# Patient Record
Sex: MALE | Race: WHITE | Employment: FULL TIME | URBAN - METROPOLITAN AREA
[De-identification: names, ages, dates, MRNs, and addresses within clinical notes are randomized per-mention and may not be internally consistent; named-entity substitution may affect disease eponyms.]

---

## 2017-02-16 DIAGNOSIS — N40.0 PROSTATISM: ICD-10-CM

## 2017-02-16 RX ORDER — TADALAFIL 5 MG/1
5 TABLET ORAL DAILY
Qty: 30 TABLET | Refills: 3 | Status: SHIPPED | OUTPATIENT
Start: 2017-02-16 | End: 2017-04-12 | Stop reason: SDUPTHER

## 2017-04-12 DIAGNOSIS — N40.0 PROSTATISM: ICD-10-CM

## 2017-05-08 RX ORDER — TADALAFIL 5 MG/1
5 TABLET ORAL DAILY
Qty: 30 TABLET | Refills: 3 | Status: SHIPPED | OUTPATIENT
Start: 2017-05-08 | End: 2018-01-29 | Stop reason: SDUPTHER

## 2017-08-14 DIAGNOSIS — I10 ESSENTIAL HYPERTENSION: ICD-10-CM

## 2017-08-15 RX ORDER — LOSARTAN POTASSIUM AND HYDROCHLOROTHIAZIDE 25; 100 MG/1; MG/1
TABLET ORAL
Qty: 90 TABLET | Refills: 0 | Status: SHIPPED | OUTPATIENT
Start: 2017-08-15 | End: 2017-09-08 | Stop reason: DRUGHIGH

## 2017-09-08 ENCOUNTER — OFFICE VISIT (OUTPATIENT)
Dept: FAMILY MEDICINE CLINIC | Age: 57
End: 2017-09-08

## 2017-09-08 VITALS
BODY MASS INDEX: 29.75 KG/M2 | OXYGEN SATURATION: 98 % | DIASTOLIC BLOOD PRESSURE: 80 MMHG | WEIGHT: 238 LBS | TEMPERATURE: 98.1 F | HEART RATE: 73 BPM | SYSTOLIC BLOOD PRESSURE: 120 MMHG

## 2017-09-08 DIAGNOSIS — I10 ESSENTIAL HYPERTENSION: ICD-10-CM

## 2017-09-08 PROCEDURE — 99213 OFFICE O/P EST LOW 20 MIN: CPT | Performed by: NURSE PRACTITIONER

## 2017-09-08 RX ORDER — LOSARTAN POTASSIUM AND HYDROCHLOROTHIAZIDE 25; 100 MG/1; MG/1
TABLET ORAL
Qty: 90 TABLET | Refills: 5 | Status: SHIPPED | OUTPATIENT
Start: 2017-09-08

## 2017-09-08 ASSESSMENT — ENCOUNTER SYMPTOMS: SHORTNESS OF BREATH: 0

## 2017-10-31 ENCOUNTER — OFFICE VISIT (OUTPATIENT)
Dept: ORTHOPEDIC SURGERY | Age: 57
End: 2017-10-31

## 2017-10-31 VITALS
BODY MASS INDEX: 31.08 KG/M2 | WEIGHT: 250 LBS | HEIGHT: 75 IN | DIASTOLIC BLOOD PRESSURE: 90 MMHG | HEART RATE: 79 BPM | SYSTOLIC BLOOD PRESSURE: 139 MMHG

## 2017-10-31 DIAGNOSIS — S93.601A FOOT SPRAIN, RIGHT, INITIAL ENCOUNTER: ICD-10-CM

## 2017-10-31 DIAGNOSIS — M79.671 FOOT PAIN, RIGHT: Primary | ICD-10-CM

## 2017-10-31 PROCEDURE — G8427 DOCREV CUR MEDS BY ELIG CLIN: HCPCS | Performed by: PHYSICIAN ASSISTANT

## 2017-10-31 PROCEDURE — 99203 OFFICE O/P NEW LOW 30 MIN: CPT | Performed by: PHYSICIAN ASSISTANT

## 2017-10-31 PROCEDURE — 3017F COLORECTAL CA SCREEN DOC REV: CPT | Performed by: PHYSICIAN ASSISTANT

## 2017-10-31 PROCEDURE — G8484 FLU IMMUNIZE NO ADMIN: HCPCS | Performed by: PHYSICIAN ASSISTANT

## 2017-10-31 PROCEDURE — G8417 CALC BMI ABV UP PARAM F/U: HCPCS | Performed by: PHYSICIAN ASSISTANT

## 2017-10-31 PROCEDURE — 73630 X-RAY EXAM OF FOOT: CPT | Performed by: PHYSICIAN ASSISTANT

## 2017-10-31 PROCEDURE — 1036F TOBACCO NON-USER: CPT | Performed by: PHYSICIAN ASSISTANT

## 2017-10-31 PROCEDURE — L3260 AMBULATORY SURGICAL BOOT EAC: HCPCS | Performed by: PHYSICIAN ASSISTANT

## 2017-10-31 NOTE — PROGRESS NOTES
Patient: Beti Aiken    MRN: [id number]  YOB: 1960          Age: 59 y.o. Sex: male    Subjective     Chief Complaint:  Pain (R Foot)      History of Present Illness:  Beti Aiken is a 59 y.o. male who presents tonight for evaluation of right great toe and 1st metatarsal pain. Patient states that yesterday he was ambulating down steps when he caught his heel pitching his foot forward creating a hyper flexion injury to his right great toe. He had immediate pain with late swelling. Since the injury he's had increased pain with weightbearing to the point where he is walking on the lateral border of his right foot. He states the pain is mainly at the 1st MTP joint. He has been icing the foot and did take ibuprofen with some benefit. He's had no previous injuries to the foot and he is ambulating with a limp. Pain Assessment  Location of Pain: Foot  Location Modifiers: Right  Severity of Pain: 7  Quality of Pain: Sharp, Throbbing  Duration of Pain: A few minutes  Frequency of Pain: Several times daily  Aggravating Factors: Walking, Standing  Limiting Behavior: Some  Relieving Factors: Rest  Result of Injury: Yes  Work-Related Injury: No  Are there other pain locations you wish to document?: No      Medical History  Current Medications:   Current Outpatient Prescriptions   Medication Sig Dispense Refill    losartan-hydrochlorothiazide (HYZAAR) 100-25 MG per tablet TAKE 1 TABLET BY MOUTH EVERY DAY 90 tablet 5    tadalafil (CIALIS) 5 MG tablet Take 1 tablet by mouth daily 30 tablet 3     No current facility-administered medications for this visit. Medical History:   Past Medical History:   Diagnosis Date    Basal cell carcinoma of skin     ear, nose.   Dr. Kylah Yoo Family history of colon cancer     Hemochromatosis     phlebotomy q 3months Dr[facility]on [facility]    Hyperlipidemia     Hypertension     borderline    Renal calculi 2000, 2002 initial encounter        Office Procedures:  Orders Placed This Encounter   Procedures    XR FOOT RIGHT (MIN 3 VIEWS)    Darco Post-op Shoe Brace     Patient was prescribed a Darco Post-Op Shoe. The right foot will require stabilization / immobilization from this semi-rigid / rigid orthosis to improve their function. The orthosis will assist in protecting the affected area, provide functional support and facilitate healing. The patient was educated and fit by a healthcare professional with expert knowledge and specialization in brace application while under the direct supervision of the treating physician. Verbal and written instructions for the use of and application of this item were provided. They were instructed to contact the office immediately should the brace result in increased pain, decreased sensation, increased swelling or worsening of the condition. Treatment Plan:  Patient was placed into a postop shoe that he is to wear with all his weightbearing activity until he can comfortably get into normal shoe wear. He is to continue with icing and may take ibuprofen 800 mg twice a day with food. He will follow-up with Dr. Saloni Lam in 2 weeks as needed. Nohelia Aguila PA-C    * Please note that some or all of this record was generated using voice recognition software. If there are any questions about the content of this document, please contact me as some errors in transcription may have occurred.

## 2017-12-19 ENCOUNTER — OFFICE VISIT (OUTPATIENT)
Dept: ORTHOPEDIC SURGERY | Age: 57
End: 2017-12-19

## 2017-12-19 VITALS
DIASTOLIC BLOOD PRESSURE: 71 MMHG | WEIGHT: 250 LBS | HEART RATE: 64 BPM | HEIGHT: 75 IN | BODY MASS INDEX: 31.08 KG/M2 | SYSTOLIC BLOOD PRESSURE: 120 MMHG

## 2017-12-19 DIAGNOSIS — M94.9 OSTEOCHONDRAL LESION: Primary | ICD-10-CM

## 2017-12-19 DIAGNOSIS — M89.9 OSTEOCHONDRAL LESION: Primary | ICD-10-CM

## 2017-12-19 DIAGNOSIS — S90.31XA CONTUSION OF RIGHT FOOT, INITIAL ENCOUNTER: ICD-10-CM

## 2017-12-19 PROCEDURE — 3017F COLORECTAL CA SCREEN DOC REV: CPT | Performed by: PODIATRIST

## 2017-12-19 PROCEDURE — 1036F TOBACCO NON-USER: CPT | Performed by: PODIATRIST

## 2017-12-19 PROCEDURE — G8484 FLU IMMUNIZE NO ADMIN: HCPCS | Performed by: PODIATRIST

## 2017-12-19 PROCEDURE — 99203 OFFICE O/P NEW LOW 30 MIN: CPT | Performed by: PODIATRIST

## 2017-12-19 PROCEDURE — G8417 CALC BMI ABV UP PARAM F/U: HCPCS | Performed by: PODIATRIST

## 2017-12-19 PROCEDURE — G8427 DOCREV CUR MEDS BY ELIG CLIN: HCPCS | Performed by: PODIATRIST

## 2017-12-19 RX ORDER — PREDNISONE 10 MG/1
TABLET ORAL
Qty: 26 TABLET | Refills: 0 | Status: SHIPPED | OUTPATIENT
Start: 2017-12-19 | End: 2017-12-22 | Stop reason: ALTCHOICE

## 2017-12-19 NOTE — PROGRESS NOTES
HISTORY OF PRESENT ILLNESS: This is an initial visit for a 41-year-old male presents with pain to his right great toe. At the end of October, he tripped going down stairs and stubbed his toe badly. Since then, he's had pain and swelling with multiple episodes of sharp pain easily aggravated with activity. He has been taking over-the-counter anti-inflammatories but that has not been helping. He's also been using postop shoe. FAMILY HISTORY: Documented in chart. SOCIAL HISTORY: Documented in chart. REVIEW OF SYSTEMS:  The patient denies any issues with dermatologic, pulmonary, cardiovascular, genitourinary, hematologic, gastrointestinal, neurologic, psychiatric, and HEENT systems. PHYSICAL EXAMINATION:     The patient is alert and orientated x3. He has mild to moderate edema to the right 1st MTP and base of the hallux with mild ecchymosis. There is no ascending cellulitis. No open lesions are present. He has pain with range of motion to the toe and very limited plantarflexion available. He is able to actively dorsiflex the toe. He has palpable pedal pulses. Sensation is grossly intact. The remainder of the exam is unremarkable. RADIOGRAPHS: 3 nonweightbearing views of the right foot were taken. These demonstrate an approximate 1 cm osteochondral lesion in the dorsal central aspect of the 1st metatarsal head. No acute fractures are noted. ASSESSMENT: 1st MTP osteochondral lesion, contusion right foot      PLAN: The patient was educated on the pathology and its treatment options. I ordered an MRI to evaluate the articular surface of the 1st MTP. We discussed potential results of the MRI and her treatment options. In the meantime, I prescribed him a prednisone 10 mg taper and advised him to use the postop shoe as needed. We will discuss the results the MRI on the phone to coordinate follow-up care.

## 2017-12-22 ENCOUNTER — OFFICE VISIT (OUTPATIENT)
Dept: FAMILY MEDICINE CLINIC | Age: 57
End: 2017-12-22

## 2017-12-22 VITALS
SYSTOLIC BLOOD PRESSURE: 120 MMHG | OXYGEN SATURATION: 96 % | HEART RATE: 68 BPM | DIASTOLIC BLOOD PRESSURE: 80 MMHG | WEIGHT: 264 LBS | BODY MASS INDEX: 33 KG/M2

## 2017-12-22 DIAGNOSIS — Z01.818 PRE-OP EXAM: Primary | ICD-10-CM

## 2017-12-22 LAB
BASOPHILS ABSOLUTE: 0.1 K/UL (ref 0–0.2)
BASOPHILS RELATIVE PERCENT: 1.3 %
EOSINOPHILS ABSOLUTE: 0.2 K/UL (ref 0–0.6)
EOSINOPHILS RELATIVE PERCENT: 3.1 %
HCT VFR BLD CALC: 46.1 % (ref 40.5–52.5)
HEMOGLOBIN: 15.9 G/DL (ref 13.5–17.5)
LYMPHOCYTES ABSOLUTE: 2.6 K/UL (ref 1–5.1)
LYMPHOCYTES RELATIVE PERCENT: 37.9 %
MCH RBC QN AUTO: 31.1 PG (ref 26–34)
MCHC RBC AUTO-ENTMCNC: 34.6 G/DL (ref 31–36)
MCV RBC AUTO: 89.9 FL (ref 80–100)
MONOCYTES ABSOLUTE: 0.6 K/UL (ref 0–1.3)
MONOCYTES RELATIVE PERCENT: 8.1 %
NEUTROPHILS ABSOLUTE: 3.5 K/UL (ref 1.7–7.7)
NEUTROPHILS RELATIVE PERCENT: 49.6 %
PDW BLD-RTO: 13.5 % (ref 12.4–15.4)
PLATELET # BLD: 223 K/UL (ref 135–450)
PMV BLD AUTO: 8.5 FL (ref 5–10.5)
RBC # BLD: 5.13 M/UL (ref 4.2–5.9)
WBC # BLD: 7 K/UL (ref 4–11)

## 2017-12-22 PROCEDURE — 99243 OFF/OP CNSLTJ NEW/EST LOW 30: CPT | Performed by: FAMILY MEDICINE

## 2017-12-22 PROCEDURE — G8427 DOCREV CUR MEDS BY ELIG CLIN: HCPCS | Performed by: FAMILY MEDICINE

## 2017-12-22 PROCEDURE — G8417 CALC BMI ABV UP PARAM F/U: HCPCS | Performed by: FAMILY MEDICINE

## 2017-12-22 PROCEDURE — G8484 FLU IMMUNIZE NO ADMIN: HCPCS | Performed by: FAMILY MEDICINE

## 2017-12-22 PROCEDURE — 93000 ELECTROCARDIOGRAM COMPLETE: CPT | Performed by: FAMILY MEDICINE

## 2017-12-22 PROCEDURE — 3017F COLORECTAL CA SCREEN DOC REV: CPT | Performed by: FAMILY MEDICINE

## 2017-12-22 NOTE — PROGRESS NOTES
[facility]  [phone]  Fax: [phone]   Pre-operative History and Physical      DIAGNOSIS:  Right great toe joint injury    PROCEDURE:  Right great toe arthroscopic surgery      History Obtained From:  patient    HISTORY OF PRESENT ILLNESS:    The patient is a 59 y.o. male with significant past medical history of hurting toe toward the end of October who presents for preoperative exam.       Past Medical History:   Diagnosis Date    Basal cell carcinoma of skin     ear, nose. Dr. Malathi Pro history of colon cancer     Hemochromatosis     phlebotomy q 3months Dr. Krzysztof Watkins at [facility]    Hyperlipidemia     Hypertension     borderline    Renal calculi 2000, 2002    x2     Past Surgical History:   Procedure Laterality Date    COLONOSCOPY  2002        COLONOSCOPY  2016    SKIN BIOPSY      TONSILLECTOMY       Current Outpatient Prescriptions   Medication Sig Dispense Refill    losartan-hydrochlorothiazide (HYZAAR) 100-25 MG per tablet TAKE 1 TABLET BY MOUTH EVERY DAY 90 tablet 5    tadalafil (CIALIS) 5 MG tablet Take 1 tablet by mouth daily 30 tablet 3     No current facility-administered medications for this visit. Allergies:  Tetanus toxoids  History of allergic reaction to anesthesia:  No     History   Smoking Status    Never Smoker   Smokeless Tobacco    Never Used     The patient states he drinks 2 per week.     Family History   Problem Relation Age of Onset    High Blood Pressure Mother     Cancer Father 68     colon    Heart Disease Father 61     CAD    Hemochromatosis Father    [facility] Cancer Sister      breast    Hemochromatosis Sister        REVIEW OF SYSTEMS:    CONSTITUTIONAL:  negative  EYES:  negative  HEENT:  negative  RESPIRATORY:  negative  CARDIOVASCULAR:  negative  GASTROINTESTINAL:  negative  GENITOURINARY:  negative  INTEGUMENT/BREAST:  negative  HEMATOLOGIC/LYMPHATIC:  negative  ALLERGIC/IMMUNOLOGIC:

## 2017-12-27 ENCOUNTER — TELEPHONE (OUTPATIENT)
Dept: ORTHOPEDIC SURGERY | Age: 57
End: 2017-12-27

## 2017-12-27 ENCOUNTER — TELEPHONE (OUTPATIENT)
Dept: FAMILY MEDICINE CLINIC | Age: 57
End: 2017-12-27

## 2017-12-27 LAB
REASON FOR REJECTION: NORMAL
REJECTED TEST: NORMAL

## 2017-12-27 NOTE — TELEPHONE ENCOUNTER
[id number]/denied due to clinicals not meeting critera for surgery per [facility]

## 2017-12-27 NOTE — TELEPHONE ENCOUNTER
[facility] for pt to call back. Please let him know there was a lab error on one of the test. Let pt know that Dr. Lalito Anton said if they need it redrawn they can do it at the time of his procedure.

## 2017-12-29 ENCOUNTER — HOSPITAL ENCOUNTER (OUTPATIENT)
Dept: SURGERY | Age: 57
Discharge: HOME | End: 2017-12-29
Attending: PODIATRIST | Admitting: PODIATRIST

## 2017-12-29 VITALS
HEART RATE: 63 BPM | DIASTOLIC BLOOD PRESSURE: 74 MMHG | OXYGEN SATURATION: 94 % | TEMPERATURE: 97.3 F | SYSTOLIC BLOOD PRESSURE: 142 MMHG | RESPIRATION RATE: 14 BRPM

## 2017-12-29 DIAGNOSIS — M20.21 HALLUX RIGIDUS OF RIGHT FOOT: Primary | ICD-10-CM

## 2017-12-29 RX ORDER — MEPERIDINE HYDROCHLORIDE 50 MG/ML
12.5 INJECTION INTRAMUSCULAR; INTRAVENOUS; SUBCUTANEOUS EVERY 5 MIN PRN
Status: DISCONTINUED | OUTPATIENT
Start: 2017-12-29 | End: 2017-12-30 | Stop reason: HOSPADM

## 2017-12-29 RX ORDER — SODIUM CHLORIDE, SODIUM LACTATE, POTASSIUM CHLORIDE, CALCIUM CHLORIDE 600; 310; 30; 20 MG/100ML; MG/100ML; MG/100ML; MG/100ML
INJECTION, SOLUTION INTRAVENOUS CONTINUOUS
Status: DISCONTINUED | OUTPATIENT
Start: 2017-12-29 | End: 2017-12-30 | Stop reason: HOSPADM

## 2017-12-29 RX ORDER — ONDANSETRON 2 MG/ML
4 INJECTION INTRAMUSCULAR; INTRAVENOUS
Status: ACTIVE | OUTPATIENT
Start: 2017-12-29 | End: 2017-12-29

## 2017-12-29 RX ORDER — PROMETHAZINE HYDROCHLORIDE 25 MG/ML
6.25 INJECTION, SOLUTION INTRAMUSCULAR; INTRAVENOUS
Status: ACTIVE | OUTPATIENT
Start: 2017-12-29 | End: 2017-12-29

## 2017-12-29 RX ORDER — LIDOCAINE HYDROCHLORIDE 10 MG/ML
1 INJECTION, SOLUTION EPIDURAL; INFILTRATION; INTRACAUDAL; PERINEURAL
Status: ACTIVE | OUTPATIENT
Start: 2017-12-29 | End: 2017-12-29

## 2017-12-29 RX ORDER — OXYCODONE HYDROCHLORIDE AND ACETAMINOPHEN 5; 325 MG/1; MG/1
1 TABLET ORAL PRN
Status: ACTIVE | OUTPATIENT
Start: 2017-12-29 | End: 2017-12-29

## 2017-12-29 RX ORDER — MORPHINE SULFATE 2 MG/ML
1 INJECTION, SOLUTION INTRAMUSCULAR; INTRAVENOUS EVERY 5 MIN PRN
Status: DISCONTINUED | OUTPATIENT
Start: 2017-12-29 | End: 2017-12-30 | Stop reason: HOSPADM

## 2017-12-29 RX ORDER — LABETALOL HYDROCHLORIDE 5 MG/ML
5 INJECTION, SOLUTION INTRAVENOUS EVERY 10 MIN PRN
Status: DISCONTINUED | OUTPATIENT
Start: 2017-12-29 | End: 2017-12-30 | Stop reason: HOSPADM

## 2017-12-29 RX ORDER — HYDRALAZINE HYDROCHLORIDE 20 MG/ML
5 INJECTION INTRAMUSCULAR; INTRAVENOUS EVERY 10 MIN PRN
Status: DISCONTINUED | OUTPATIENT
Start: 2017-12-29 | End: 2017-12-30 | Stop reason: HOSPADM

## 2017-12-29 RX ORDER — PROMETHAZINE HYDROCHLORIDE 25 MG/1
25 TABLET ORAL EVERY 6 HOURS PRN
Qty: 30 TABLET | Refills: 0 | Status: SHIPPED | OUTPATIENT
Start: 2017-12-29 | End: 2018-01-05

## 2017-12-29 RX ORDER — MORPHINE SULFATE 2 MG/ML
2 INJECTION, SOLUTION INTRAMUSCULAR; INTRAVENOUS EVERY 5 MIN PRN
Status: DISCONTINUED | OUTPATIENT
Start: 2017-12-29 | End: 2017-12-30 | Stop reason: HOSPADM

## 2017-12-29 RX ORDER — OXYCODONE HYDROCHLORIDE AND ACETAMINOPHEN 5; 325 MG/1; MG/1
2 TABLET ORAL PRN
Status: ACTIVE | OUTPATIENT
Start: 2017-12-29 | End: 2017-12-29

## 2017-12-29 RX ORDER — OXYCODONE HYDROCHLORIDE AND ACETAMINOPHEN 5; 325 MG/1; MG/1
1 TABLET ORAL EVERY 8 HOURS PRN
Qty: 30 TABLET | Refills: 0 | Status: SHIPPED | OUTPATIENT
Start: 2017-12-29 | End: 2018-01-02

## 2017-12-29 RX ORDER — DIPHENHYDRAMINE HYDROCHLORIDE 50 MG/ML
12.5 INJECTION INTRAMUSCULAR; INTRAVENOUS
Status: ACTIVE | OUTPATIENT
Start: 2017-12-29 | End: 2017-12-29

## 2017-12-29 RX ADMIN — SODIUM CHLORIDE, SODIUM LACTATE, POTASSIUM CHLORIDE, CALCIUM CHLORIDE: 600; 310; 30; 20 INJECTION, SOLUTION INTRAVENOUS at 08:37

## 2017-12-29 ASSESSMENT — PAIN SCALES - GENERAL: PAINLEVEL_OUTOF10: 0

## 2017-12-29 NOTE — ANESTHESIA POST-OP
Postoperative Anesthesia Note    Name:    Ivelisse Menon  MRN:      [id number]    Patient Vitals for the past 12 hrs:   BP Temp Temp src Pulse Resp SpO2   12/29/17 1325 (!) 142/74 - - 63 14 94 %   12/29/17 1315 134/64 - - 67 16 93 %   12/29/17 1310 123/74 - - 68 10 94 %   12/29/17 1305 123/74 - - 72 17 96 %   12/29/17 1300 122/68 - - 70 15 93 %   12/29/17 1259 111/66 - - 64 13 94 %   12/29/17 1258 111/66 - - 62 13 94 %   12/29/17 1255 (!) 104/58 97.3 °F (36.3 °C) - 63 13 95 %   12/29/17 1250 106/62 - - 67 14 94 %   12/29/17 1245 - - - 74 - 90 %   12/29/17 1244 100/60 97.5 °F (36.4 °C) Temporal 69 16 91 %   12/29/17 0824 (!) 156/92 97.6 °F (36.4 °C) - 67 16 97 %        LABS:    CBC  Lab Results   Component Value Date/Time    WBC 7.0 12/22/2017 02:57 PM    HGB 15.9 12/22/2017 02:57 PM    HCT 46.1 12/22/2017 02:57 PM     12/22/2017 02:57 PM     RENAL  Lab Results   Component Value Date/Time     07/28/2016 08:54 AM    K 4.4 07/28/2016 08:54 AM    CL 98 (L) 07/28/2016 08:54 AM    CO2 28 07/28/2016 08:54 AM    BUN 15 07/28/2016 08:54 AM    CREATININE 1.0 07/28/2016 08:54 AM    GLUCOSE 90 07/28/2016 08:54 AM     COAGS  No results found for: PROTIME, INR, APTT    Intake & Output: In: 56 [P.O.:240; I.V.:650]  Out: 25     Nausea & Vomiting:  No    Level of Consciousness:  Awake    Pain Assessment:  Adequate analgesia    Anesthesia Complications:  No apparent anesthetic complications    SUMMARY      Vital signs stable  OK to discharge from Stage I post anesthesia care.   Care transferred from Anesthesiology department on discharge from perioperative area

## 2017-12-29 NOTE — H&P
I examined the patient. I reviewed the patient's pre-op history and physical, no changes are noted.     Marian Pacheco DPM

## 2017-12-29 NOTE — PROGRESS NOTES
Wife to bedside. Discharge instructions reviewed with patient/responsible adult and understanding verbalized. Discharge instructions signed and copies given. Patient to be  discharged home with belongings. Percocet and phenergan script given.

## 2017-12-29 NOTE — ANESTHESIA PRE-OP
Department of Anesthesiology  Preprocedure Note       Name:  Ghulam Cristina   Age:  59 y.o.  :  1960                                          MRN:  [id number]         Date:  2017      Surgeon:    Procedure:    Medications prior to admission:   Prior to Admission medications    Medication Sig Start Date End Date Taking? Authorizing Provider   losartan-hydrochlorothiazide (HYZAAR) 100-25 MG per tablet TAKE 1 TABLET BY MOUTH EVERY DAY 17  Yes CHICO Lacey   tadalafil (CIALIS) 5 MG tablet Take 1 tablet by mouth daily 17  Yes Dario Martinez MD       Current medications:    Current Outpatient Prescriptions   Medication Sig Dispense Refill    losartan-hydrochlorothiazide (HYZAAR) 100-25 MG per tablet TAKE 1 TABLET BY MOUTH EVERY DAY 90 tablet 5    tadalafil (CIALIS) 5 MG tablet Take 1 tablet by mouth daily 30 tablet 3     Current Facility-Administered Medications   Medication Dose Route Frequency Provider Last Rate Last Dose    lactated ringers infusion   Intravenous Continuous Naomie Robin  mL/hr at 17 0837      lidocaine PF 1 % injection 1 mL  1 mL Intradermal Once PRN Naomie Robin MD        ceFAZolin (ANCEF) 2 g in sterile water 20 mL IV syringe  2 g Intravenous On Call to [address] [place] [address], [facility]           Allergies: Allergies   Allergen Reactions    Tetanus Toxoids Swelling       Problem List:    Patient Active Problem List   Diagnosis Code    Renal calculi N20.0    Basal cell carcinoma of skin C44.91    Hereditary hemochromatosis ([facility] [address] E83.110    Hyperlipidemia E78.5    Hypertension I10    Osteochondral lesion M89.9, M94.9       Past Medical History:        Diagnosis Date    Basal cell carcinoma of skin     ear, nose.   Dr. Zaid Brown Family history of colon cancer     Hemochromatosis     phlebotomy q 3months Dr[facility] Tuyet Galarza at [facility]    Hyperlipidemia     Hypertension     borderline    Renal calculi , 2002    x2 Allergen Reactions    Tetanus Toxoids Swelling       Social History   Substance Use Topics    Smoking status: Never Smoker    Smokeless tobacco: Never Used    Alcohol use 0.0 oz/week      Comment: 2/wk       LABS:    CBC  Lab Results   Component Value Date/Time    WBC 7.0 12/22/2017 02:57 PM    HGB 15.9 12/22/2017 02:57 PM    HCT 46.1 12/22/2017 02:57 PM     12/22/2017 02:57 PM     RENAL  Lab Results   Component Value Date/Time     07/28/2016 08:54 AM    K 4.4 07/28/2016 08:54 AM    CL 98 (L) 07/28/2016 08:54 AM    CO2 28 07/28/2016 08:54 AM    BUN 15 07/28/2016 08:54 AM    CREATININE 1.0 07/28/2016 08:54 AM    GLUCOSE 90 07/28/2016 08:54 AM     COAGS  No results found for: PROTIME, INR, APTT     Anesthesia Plan      MAC     ASA 2     (Risks, benefits and alternatives of MAC anesthesia/GA as needed discussed with pt. Questions answered. Willing to proceed.)  Induction: intravenous. Anesthetic plan and risks discussed with patient and spouse.                       Blaise Reyes MD   12/29/2017

## 2018-01-02 ENCOUNTER — OFFICE VISIT (OUTPATIENT)
Dept: ORTHOPEDIC SURGERY | Age: 58
End: 2018-01-02

## 2018-01-02 VITALS
DIASTOLIC BLOOD PRESSURE: 83 MMHG | BODY MASS INDEX: 31.08 KG/M2 | HEIGHT: 75 IN | SYSTOLIC BLOOD PRESSURE: 132 MMHG | HEART RATE: 66 BPM | WEIGHT: 250 LBS

## 2018-01-02 DIAGNOSIS — Z09 POSTOP CHECK: Primary | ICD-10-CM

## 2018-01-02 PROCEDURE — 99024 POSTOP FOLLOW-UP VISIT: CPT | Performed by: PODIATRIST

## 2018-01-02 NOTE — PROGRESS NOTES
Initial postop check. He denies any fever or chills. His pain is well managed. This some mild erythema over the dorsum of the hallux but no ascending cellulitis. The incision is well approximated without drainage. There is minimal edema. 3 nonweightbearing views of the right foot were taken. These demonstrate excellent spacing at the 1st MTP. Status post 1st MTP implant arthroplasty right foot ×4 days    I reviewed the intraoperative pictures and today's x-rays with patient. The postoperative dressing was changed. He will keep this clean dry and intact. He will use the postop shoe full-time. I will see him back in 1 week.

## 2018-01-09 ENCOUNTER — OFFICE VISIT (OUTPATIENT)
Dept: ORTHOPEDIC SURGERY | Age: 58
End: 2018-01-09

## 2018-01-09 VITALS
HEIGHT: 75 IN | WEIGHT: 250 LBS | BODY MASS INDEX: 31.08 KG/M2 | SYSTOLIC BLOOD PRESSURE: 134 MMHG | HEART RATE: 74 BPM | DIASTOLIC BLOOD PRESSURE: 87 MMHG

## 2018-01-09 DIAGNOSIS — Z09 POSTOP CHECK: Primary | ICD-10-CM

## 2018-01-09 PROCEDURE — 99024 POSTOP FOLLOW-UP VISIT: CPT | Performed by: PODIATRIST

## 2018-01-09 NOTE — PROGRESS NOTES
Approximate 2 week postop check. No new complaints. He has mild edema to the right hallux. No signs of infection are present. The skin incision appears to be well-healed. Status post 1st MTP implant arthroplasty right foot ×2 weeks    The sutures and Steri-Strips were removed. He can begin getting the foot wet. I instructed him on applying a 1 inch Coban dressing. Range of motion exercises were given. He will continue with postop shoe for at least another week. I'll see him back in 2 weeks.

## 2018-01-23 ENCOUNTER — OFFICE VISIT (OUTPATIENT)
Dept: ORTHOPEDIC SURGERY | Age: 58
End: 2018-01-23

## 2018-01-23 VITALS
HEIGHT: 75 IN | HEART RATE: 78 BPM | WEIGHT: 250 LBS | DIASTOLIC BLOOD PRESSURE: 89 MMHG | BODY MASS INDEX: 31.08 KG/M2 | SYSTOLIC BLOOD PRESSURE: 135 MMHG

## 2018-01-23 DIAGNOSIS — Z09 POSTOP CHECK: Primary | ICD-10-CM

## 2018-01-23 PROCEDURE — 99024 POSTOP FOLLOW-UP VISIT: CPT | Performed by: PODIATRIST

## 2018-01-23 NOTE — PROGRESS NOTES
4 week postop check. He states that he is doing very well and has little to no pain. He still notices swelling to the foot. That is improving. There is mild edema to the 1st MTP and base of the hallux. There is no pain with range of motion. There is no palpable tenderness over the joint. No signs of infection are present. Status post 1st MTP implant arthroplasty right foot ×4 weeks    I advised him to continue wrapping the toe with Coban. He can expect to do this for the next several weeks. He can gradually increase activity as tolerated. He should hold off on using a treadmill for at least another couple of weeks until the swelling improves. I will see him back in 4 weeks.

## 2018-01-29 DIAGNOSIS — N40.0 PROSTATISM: ICD-10-CM

## 2018-01-29 RX ORDER — TADALAFIL 5 MG/1
5 TABLET ORAL DAILY
Qty: 30 TABLET | Refills: 3 | Status: SHIPPED | OUTPATIENT
Start: 2018-01-29 | End: 2019-03-25 | Stop reason: SDUPTHER

## 2018-02-20 ENCOUNTER — TELEPHONE (OUTPATIENT)
Dept: FAMILY MEDICINE CLINIC | Age: 58
End: 2018-02-20

## 2018-03-05 RX ORDER — INDOMETHACIN 50 MG/1
50 CAPSULE ORAL 3 TIMES DAILY
Qty: 21 CAPSULE | Refills: 0 | Status: SHIPPED | OUTPATIENT
Start: 2018-03-05 | End: 2018-05-24 | Stop reason: SDUPTHER

## 2018-04-11 PROBLEM — Z09 POSTOP CHECK: Status: RESOLVED | Noted: 2018-01-09 | Resolved: 2018-04-11

## 2018-05-24 ENCOUNTER — OFFICE VISIT (OUTPATIENT)
Dept: FAMILY MEDICINE CLINIC | Age: 58
End: 2018-05-24

## 2018-05-24 VITALS
DIASTOLIC BLOOD PRESSURE: 84 MMHG | SYSTOLIC BLOOD PRESSURE: 132 MMHG | WEIGHT: 268 LBS | HEART RATE: 84 BPM | BODY MASS INDEX: 33.5 KG/M2 | OXYGEN SATURATION: 98 %

## 2018-05-24 DIAGNOSIS — M10.9 GOUT, UNSPECIFIED CAUSE, UNSPECIFIED CHRONICITY, UNSPECIFIED SITE: Primary | ICD-10-CM

## 2018-05-24 DIAGNOSIS — I10 ESSENTIAL HYPERTENSION: ICD-10-CM

## 2018-05-24 LAB
A/G RATIO: 1.7 (ref 1.1–2.2)
ALBUMIN SERPL-MCNC: 4.3 G/DL (ref 3.4–5)
ALP BLD-CCNC: 81 U/L (ref 40–129)
ALT SERPL-CCNC: 18 U/L (ref 10–40)
ANION GAP SERPL CALCULATED.3IONS-SCNC: 13 MMOL/L (ref 3–16)
AST SERPL-CCNC: 19 U/L (ref 15–37)
BILIRUB SERPL-MCNC: 0.6 MG/DL (ref 0–1)
BUN BLDV-MCNC: 13 MG/DL (ref 7–20)
CALCIUM SERPL-MCNC: 9.7 MG/DL (ref 8.3–10.6)
CHLORIDE BLD-SCNC: 101 MMOL/L (ref 99–110)
CO2: 28 MMOL/L (ref 21–32)
CREAT SERPL-MCNC: 0.9 MG/DL (ref 0.8–1.3)
GFR AFRICAN AMERICAN: >60
GFR NON-AFRICAN AMERICAN: >60
GLOBULIN: 2.5 G/DL
GLUCOSE BLD-MCNC: 110 MG/DL (ref 70–99)
POTASSIUM SERPL-SCNC: 4.1 MMOL/L (ref 3.5–5.1)
SODIUM BLD-SCNC: 142 MMOL/L (ref 136–145)
TOTAL PROTEIN: 6.8 G/DL (ref 6.4–8.2)
URIC ACID, SERUM: 7.8 MG/DL (ref 3.5–7.2)

## 2018-05-24 PROCEDURE — 99214 OFFICE O/P EST MOD 30 MIN: CPT | Performed by: FAMILY MEDICINE

## 2018-05-24 PROCEDURE — 36415 COLL VENOUS BLD VENIPUNCTURE: CPT | Performed by: FAMILY MEDICINE

## 2018-05-24 RX ORDER — INDOMETHACIN 50 MG/1
50 CAPSULE ORAL 3 TIMES DAILY
Qty: 21 CAPSULE | Refills: 1 | Status: SHIPPED | OUTPATIENT
Start: 2018-05-24

## 2018-05-24 ASSESSMENT — PATIENT HEALTH QUESTIONNAIRE - PHQ9
SUM OF ALL RESPONSES TO PHQ QUESTIONS 1-9: 0
2. FEELING DOWN, DEPRESSED OR HOPELESS: 0
1. LITTLE INTEREST OR PLEASURE IN DOING THINGS: 0
SUM OF ALL RESPONSES TO PHQ9 QUESTIONS 1 & 2: 0

## 2018-12-29 DIAGNOSIS — I10 ESSENTIAL HYPERTENSION: ICD-10-CM

## 2018-12-31 RX ORDER — LOSARTAN POTASSIUM AND HYDROCHLOROTHIAZIDE 25; 100 MG/1; MG/1
TABLET ORAL
Qty: 90 TABLET | Refills: 0 | Status: SHIPPED | OUTPATIENT
Start: 2018-12-31 | End: 2019-05-05 | Stop reason: SDUPTHER

## 2019-02-12 ENCOUNTER — TELEPHONE (OUTPATIENT)
Dept: FAMILY MEDICINE CLINIC | Age: 59
End: 2019-02-12

## 2019-02-12 RX ORDER — OSELTAMIVIR PHOSPHATE 75 MG/1
75 CAPSULE ORAL 2 TIMES DAILY
Qty: 10 CAPSULE | Refills: 0 | Status: SHIPPED | OUTPATIENT
Start: 2019-02-12 | End: 2019-02-17